# Patient Record
Sex: FEMALE | Race: WHITE | Employment: STUDENT | URBAN - METROPOLITAN AREA
[De-identification: names, ages, dates, MRNs, and addresses within clinical notes are randomized per-mention and may not be internally consistent; named-entity substitution may affect disease eponyms.]

---
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
## 2019-02-01 ENCOUNTER — OFFICE VISIT (OUTPATIENT)
Dept: INTERNAL MEDICINE CLINIC | Facility: CLINIC | Age: 19
End: 2019-02-01
Payer: COMMERCIAL

## 2019-02-01 VITALS
HEIGHT: 64.5 IN | SYSTOLIC BLOOD PRESSURE: 118 MMHG | RESPIRATION RATE: 16 BRPM | WEIGHT: 179 LBS | BODY MASS INDEX: 30.19 KG/M2 | DIASTOLIC BLOOD PRESSURE: 76 MMHG | HEART RATE: 74 BPM

## 2019-02-01 DIAGNOSIS — J45.20 MILD INTERMITTENT ASTHMA WITHOUT COMPLICATION: ICD-10-CM

## 2019-02-01 PROCEDURE — 99214 OFFICE O/P EST MOD 30 MIN: CPT | Performed by: NURSE PRACTITIONER

## 2019-02-01 NOTE — TELEPHONE ENCOUNTER
Fax from [facility] S Mayelin Ulloa - prior auth needed for Phentermine HCl 15 MG. Form in triage. Another fax from [place] stating phentermine 15mg ODT capsules are cheaper than tablets - ok to dispense? Form in triage.

## 2019-02-01 NOTE — PROGRESS NOTES
HPI:    Patient ID: Crystal Martinez is a 22year old female. Patient presents with:  Weight Problem      Patient has been away at school for 2 years. She gained 20# her freshman year d/t change in diet because she lived in [place].  She ate a lot of fried REPAIR Left 2/19/2015    Performed by Karan Pickett MD at [facility] MAIN OR     Family History   Problem Relation Age of Onset   • Hypertension Mother    • Heart Disorder Maternal Grandfather    • Heart Disorder Paternal Grandfather      Social History    Soci oriented to person, place, and time. Skin: Skin is warm and dry. Psychiatric: She has a normal mood and affect.             ASSESSMENT/PLAN:   Diagnoses and all orders for this visit:    BMI 30.0-30.9,adult- start phentermine every morning, discussed si

## 2019-02-04 ENCOUNTER — TELEPHONE (OUTPATIENT)
Dept: INTERNAL MEDICINE CLINIC | Facility: CLINIC | Age: 19
End: 2019-02-04

## 2019-02-04 RX ORDER — PHENTERMINE HYDROCHLORIDE 15 MG/1
15 CAPSULE ORAL EVERY MORNING
Qty: 30 CAPSULE | Refills: 0 | OUTPATIENT
Start: 2019-02-04 | End: 2019-02-28 | Stop reason: ALTCHOICE

## 2019-02-04 NOTE — TELEPHONE ENCOUNTER
Per pharmacy Phentermine capsules are cheaper than dispersible tablets. Per Nathan Matta ok to change. New Rx called in and pharmacist will inform patient when ready.

## 2019-02-04 NOTE — TELEPHONE ENCOUNTER
Patient's mom called and requested to speak to a RN referencing to having a hard time refilling a prescription.

## 2019-02-28 ENCOUNTER — TELEPHONE (OUTPATIENT)
Dept: INTERNAL MEDICINE CLINIC | Facility: CLINIC | Age: 19
End: 2019-02-28

## 2019-02-28 RX ORDER — PHENTERMINE HYDROCHLORIDE 30 MG/1
30 CAPSULE ORAL EVERY MORNING
Qty: 30 CAPSULE | Refills: 0 | Status: SHIPPED | OUTPATIENT
Start: 2019-02-28 | End: 2019-11-27

## 2019-02-28 NOTE — TELEPHONE ENCOUNTER
Patient away at school, was seeing mother today in office. Patient has not noticed any significant decrease in weight, denies palpitations.  Would like to increase dose

## 2019-03-06 ENCOUNTER — TELEPHONE (OUTPATIENT)
Dept: INTERNAL MEDICINE CLINIC | Facility: CLINIC | Age: 19
End: 2019-03-06

## 2019-03-06 NOTE — TELEPHONE ENCOUNTER
Patient's dad dropped off physical form to be completed and signed - call him when complete, he can  on Friday, #[phone]. Form in triage. right facial droop/ +involving right forehead

## 2019-03-21 ENCOUNTER — OFFICE VISIT (OUTPATIENT)
Dept: INTERNAL MEDICINE CLINIC | Facility: CLINIC | Age: 19
End: 2019-03-21
Payer: COMMERCIAL

## 2019-03-21 VITALS
BODY MASS INDEX: 29.68 KG/M2 | HEART RATE: 90 BPM | HEIGHT: 64.4 IN | TEMPERATURE: 98 F | OXYGEN SATURATION: 99 % | WEIGHT: 176 LBS | RESPIRATION RATE: 16 BRPM | DIASTOLIC BLOOD PRESSURE: 68 MMHG | SYSTOLIC BLOOD PRESSURE: 114 MMHG

## 2019-03-21 DIAGNOSIS — Z00.00 ANNUAL PHYSICAL EXAM: Primary | ICD-10-CM

## 2019-03-21 DIAGNOSIS — E66.09 OBESITY DUE TO EXCESS CALORIES WITHOUT SERIOUS COMORBIDITY, UNSPECIFIED CLASSIFICATION: ICD-10-CM

## 2019-03-21 PROCEDURE — 99395 PREV VISIT EST AGE 18-39: CPT | Performed by: NURSE PRACTITIONER

## 2019-03-21 RX ORDER — PHENTERMINE HYDROCHLORIDE 30 MG/1
30 CAPSULE ORAL EVERY MORNING
Qty: 30 CAPSULE | Refills: 0 | Status: SHIPPED | OUTPATIENT
Start: 2019-04-30 | End: 2019-06-27

## 2019-03-21 RX ORDER — PHENTERMINE HYDROCHLORIDE 30 MG/1
30 CAPSULE ORAL EVERY MORNING
Qty: 30 CAPSULE | Refills: 0 | Status: SHIPPED | OUTPATIENT
Start: 2019-03-31 | End: 2019-06-27

## 2019-03-21 NOTE — PROGRESS NOTES
Wellness Exam    CC: Patient is presenting for a wellness exam    HPI:   Concerns: none  Also f/u for weight loss--  Pt on phentermine for weight loss. Did not feel like 15mg was helping and states the 30mg PO daily dose is working better.  She is exercisin 84 tablet Rfl: 3   Albuterol Sulfate HFA (PROAIR HFA) 108 (90 Base) MCG/ACT Inhalation Aero Soln Inhale 2 puffs into the lungs every 6 (six) hours as needed for Wheezing or Shortness of Breath.  Disp: 3 Inhaler Rfl: 3   ACZONE 7.5 % External Gel APPLY A THI rub heard. Pulmonary/Chest: Effort normal and breath sounds normal bilaterally. She has no wheezes or rales. Breasts: deferred  Abdominal: Soft. Bowel sounds are normal. There is no tenderness. No HSM. Musculoskeletal: Normal range of motion.  She exhib

## 2019-03-21 NOTE — PROGRESS NOTES
Patient was seen and examined by me as well as APN student. Agree with assessment and plan.    NINI Rios

## 2019-06-27 ENCOUNTER — OFFICE VISIT (OUTPATIENT)
Dept: INTERNAL MEDICINE CLINIC | Facility: CLINIC | Age: 19
End: 2019-06-27
Payer: COMMERCIAL

## 2019-06-27 VITALS
TEMPERATURE: 98 F | OXYGEN SATURATION: 97 % | SYSTOLIC BLOOD PRESSURE: 110 MMHG | HEART RATE: 78 BPM | HEIGHT: 64.4 IN | RESPIRATION RATE: 16 BRPM | WEIGHT: 174 LBS | BODY MASS INDEX: 29.35 KG/M2 | DIASTOLIC BLOOD PRESSURE: 60 MMHG

## 2019-06-27 DIAGNOSIS — E66.3 OVERWEIGHT (BMI 25.0-29.9): Primary | ICD-10-CM

## 2019-06-27 DIAGNOSIS — Z97.5 CONTRACEPTIVE DEVICE, INTRAUTERINE: ICD-10-CM

## 2019-06-27 PROCEDURE — 99213 OFFICE O/P EST LOW 20 MIN: CPT | Performed by: NURSE PRACTITIONER

## 2019-06-27 RX ORDER — PHENTERMINE HYDROCHLORIDE 30 MG/1
30 CAPSULE ORAL EVERY MORNING
Qty: 30 CAPSULE | Refills: 2 | Status: SHIPPED | OUTPATIENT
Start: 2019-06-27 | End: 2019-07-16

## 2019-06-27 NOTE — PROGRESS NOTES
HPI:    Patient ID: Danae Ariza is a 20year old female. Patient presents with:  Medication Request: Phentermine      Feeling well on phentermine. She was in [place] in April and was eating excessive amt of food and is now back on track.  Down 2# from Medications:  Phentermine HCl 30 MG Oral Cap Take 1 capsule (30 mg total) by mouth every morning. Disp: 30 capsule Rfl: 2   Phentermine HCl 30 MG Oral Cap Take 1 capsule (30 mg total) by mouth every morning.  Disp: 30 capsule Rfl: 0   Norethin Ace-Eth [place] [place]

## 2019-07-16 ENCOUNTER — OFFICE VISIT (OUTPATIENT)
Dept: OBGYN CLINIC | Facility: CLINIC | Age: 19
End: 2019-07-16
Payer: COMMERCIAL

## 2019-07-16 VITALS
DIASTOLIC BLOOD PRESSURE: 84 MMHG | WEIGHT: 172 LBS | HEIGHT: 63.5 IN | BODY MASS INDEX: 30.1 KG/M2 | SYSTOLIC BLOOD PRESSURE: 128 MMHG

## 2019-07-16 DIAGNOSIS — Z11.3 SCREEN FOR STD (SEXUALLY TRANSMITTED DISEASE): ICD-10-CM

## 2019-07-16 DIAGNOSIS — Z30.09 COUNSELING FOR BIRTH CONTROL REGARDING INTRAUTERINE DEVICE (IUD): Primary | ICD-10-CM

## 2019-07-16 PROCEDURE — 87591 N.GONORRHOEAE DNA AMP PROB: CPT | Performed by: NURSE PRACTITIONER

## 2019-07-16 PROCEDURE — 99213 OFFICE O/P EST LOW 20 MIN: CPT | Performed by: NURSE PRACTITIONER

## 2019-07-16 PROCEDURE — 87491 CHLMYD TRACH DNA AMP PROBE: CPT | Performed by: NURSE PRACTITIONER

## 2019-07-16 RX ORDER — MISOPROSTOL 200 UG/1
TABLET ORAL
Qty: 2 TABLET | Refills: 0 | Status: SHIPPED | OUTPATIENT
Start: 2019-07-16 | End: 2019-07-24

## 2019-07-16 NOTE — PROGRESS NOTES
Here for new gynecology visit. 20year old G 0 P 0. Patient's last menstrual period was 06/25/2019 (exact date). Here to discuss IUD insertion. She in unsure if she wants to do non- hormonal or the hormonal IUD. Menses Q 28 days for 4-5 days.   OCP pneumonia in past.  Heart:  No chest pain, palpitations. Breasts:  No pain, lumps or secretions. GI:   No nausea, emesis, reflux, liver disease, GB problems, issues with diarrhea or constipation.   :   No urgency, frequency, KASEY, bladder problems in pas

## 2019-07-17 LAB
C TRACH DNA SPEC QL NAA+PROBE: NEGATIVE
N GONORRHOEA DNA SPEC QL NAA+PROBE: NEGATIVE

## 2019-07-24 ENCOUNTER — OFFICE VISIT (OUTPATIENT)
Dept: OBGYN CLINIC | Facility: CLINIC | Age: 19
End: 2019-07-24
Payer: COMMERCIAL

## 2019-07-24 VITALS
DIASTOLIC BLOOD PRESSURE: 78 MMHG | HEIGHT: 63.5 IN | WEIGHT: 169 LBS | BODY MASS INDEX: 29.57 KG/M2 | SYSTOLIC BLOOD PRESSURE: 130 MMHG

## 2019-07-24 DIAGNOSIS — Z30.430 ENCOUNTER FOR INSERTION OF INTRAUTERINE CONTRACEPTIVE DEVICE: Primary | ICD-10-CM

## 2019-07-24 DIAGNOSIS — Z01.812 PRE-PROCEDURAL LABORATORY EXAMINATION: ICD-10-CM

## 2019-07-24 LAB — CONTROL LINE PRESENT WITH A CLEAR BACKGROUND (YES/NO): YES YES/NO

## 2019-07-24 PROCEDURE — 58300 INSERT INTRAUTERINE DEVICE: CPT | Performed by: NURSE PRACTITIONER

## 2019-07-24 PROCEDURE — 81025 URINE PREGNANCY TEST: CPT | Performed by: NURSE PRACTITIONER

## 2019-07-24 NOTE — PROGRESS NOTES
Procedure:    S:  The patient was consented for Hall Coke placement. Risks and benefits were discussed including infection, uterine perforation, uterine expulsion, PID, ectopic and intrauterine pregnancy. All questions were answered.     O:  The patient was

## 2019-08-15 ENCOUNTER — OFFICE VISIT (OUTPATIENT)
Dept: OBGYN CLINIC | Facility: CLINIC | Age: 19
End: 2019-08-15
Payer: COMMERCIAL

## 2019-08-15 VITALS
HEIGHT: 63.5 IN | WEIGHT: 171 LBS | BODY MASS INDEX: 29.92 KG/M2 | SYSTOLIC BLOOD PRESSURE: 122 MMHG | DIASTOLIC BLOOD PRESSURE: 70 MMHG

## 2019-08-15 DIAGNOSIS — Z30.431 IUD CHECK UP: Primary | ICD-10-CM

## 2019-08-15 PROCEDURE — 99213 OFFICE O/P EST LOW 20 MIN: CPT | Performed by: NURSE PRACTITIONER

## 2019-08-15 NOTE — PROGRESS NOTES
Gyne note     S: patient is a 20year old yo  here for IUD check   Bleeding: She had a few days of spotting following the insertion but none since. Pain: minimal cramping after placement, none now.     O:/70   Ht 63.5\"   Wt 171 lb   LMP

## 2019-11-27 ENCOUNTER — TELEPHONE (OUTPATIENT)
Dept: INTERNAL MEDICINE CLINIC | Facility: CLINIC | Age: 19
End: 2019-11-27

## 2019-11-27 ENCOUNTER — OFFICE VISIT (OUTPATIENT)
Dept: INTERNAL MEDICINE CLINIC | Facility: CLINIC | Age: 19
End: 2019-11-27
Payer: COMMERCIAL

## 2019-11-27 VITALS
HEART RATE: 76 BPM | RESPIRATION RATE: 16 BRPM | WEIGHT: 169 LBS | TEMPERATURE: 98 F | OXYGEN SATURATION: 100 % | BODY MASS INDEX: 29.57 KG/M2 | SYSTOLIC BLOOD PRESSURE: 132 MMHG | HEIGHT: 63.5 IN | DIASTOLIC BLOOD PRESSURE: 80 MMHG

## 2019-11-27 DIAGNOSIS — J22 ACUTE LOWER RESPIRATORY INFECTION: Primary | ICD-10-CM

## 2019-11-27 DIAGNOSIS — E66.3 OVERWEIGHT (BMI 25.0-29.9): ICD-10-CM

## 2019-11-27 PROCEDURE — 99214 OFFICE O/P EST MOD 30 MIN: CPT | Performed by: PHYSICIAN ASSISTANT

## 2019-11-27 PROCEDURE — 90471 IMMUNIZATION ADMIN: CPT | Performed by: PHYSICIAN ASSISTANT

## 2019-11-27 PROCEDURE — 90686 IIV4 VACC NO PRSV 0.5 ML IM: CPT | Performed by: PHYSICIAN ASSISTANT

## 2019-11-27 RX ORDER — BENZONATATE 200 MG/1
200 CAPSULE ORAL 3 TIMES DAILY PRN
Qty: 20 CAPSULE | Refills: 0 | Status: SHIPPED | OUTPATIENT
Start: 2019-11-27

## 2019-11-27 RX ORDER — AZITHROMYCIN 250 MG/1
TABLET, FILM COATED ORAL
Qty: 6 TABLET | Refills: 0 | Status: SHIPPED | OUTPATIENT
Start: 2019-11-27 | End: 2021-03-30

## 2019-11-27 RX ORDER — PHENTERMINE HYDROCHLORIDE 30 MG/1
30 CAPSULE ORAL EVERY MORNING
Qty: 30 CAPSULE | Refills: 2 | Status: SHIPPED | OUTPATIENT
Start: 2019-11-27 | End: 2020-08-10

## 2019-11-27 NOTE — PATIENT INSTRUCTIONS
Take antibiotic as directed until completely finished. Contact us if you experience any adverse reaction to the medication.   Use tessalon up to 3 times daily, as needed, for cough   Restart phentermine when feeling better: take once a day before breakfast,

## 2019-11-27 NOTE — TELEPHONE ENCOUNTER
PA completed via Epic awaiting decision. Approved   11/27/2019 12:08 PM   Case ID: [id number] Appeal supported: No   Note from payer: TONO:[id number];ABDOUL:[email or web address]; Review Type:Prior Auth; Coverage Start Date:10/28/2019; Coverage End Date:11/26/2020;   Pay

## 2019-11-27 NOTE — PROGRESS NOTES
Dustin Smith is a 20year old female. HPI:   C/o cough, chest congestion x 4 days, gradually worsening. Delsym otc not helping. Denies sinus pain, rash, sore throat    Also f/u on phentermine.  Did very well on previous rx, lost about 15 lbs, no side effe (76.7 kg)   LMP 11/06/2019 (Approximate)   SpO2 100%   BMI 29.47 kg/m²   GENERAL: well developed, well nourished,in no apparent distress  SKIN: no rashes,no suspicious lesions, warm and dry  HEENT: atraumatic, normocephalic,ears and throat are clear.  TM's

## 2020-03-10 ENCOUNTER — PATIENT OUTREACH (OUTPATIENT)
Dept: INTERNAL MEDICINE CLINIC | Facility: CLINIC | Age: 20
End: 2020-03-10

## 2020-03-12 ENCOUNTER — MED REC SCAN ONLY (OUTPATIENT)
Dept: INTERNAL MEDICINE CLINIC | Facility: CLINIC | Age: 20
End: 2020-03-12

## 2020-08-10 DIAGNOSIS — E66.3 OVERWEIGHT (BMI 25.0-29.9): ICD-10-CM

## 2020-08-10 RX ORDER — PHENTERMINE HYDROCHLORIDE 30 MG/1
30 CAPSULE ORAL EVERY MORNING
Qty: 30 CAPSULE | Refills: 2 | Status: SHIPPED | OUTPATIENT
Start: 2020-08-10 | End: 2021-03-30

## 2021-03-30 ENCOUNTER — OFFICE VISIT (OUTPATIENT)
Dept: INTERNAL MEDICINE CLINIC | Facility: CLINIC | Age: 21
End: 2021-03-30
Payer: COMMERCIAL

## 2021-03-30 VITALS
HEIGHT: 63.5 IN | RESPIRATION RATE: 16 BRPM | TEMPERATURE: 99 F | BODY MASS INDEX: 30.8 KG/M2 | HEART RATE: 75 BPM | WEIGHT: 176 LBS | SYSTOLIC BLOOD PRESSURE: 128 MMHG | OXYGEN SATURATION: 95 % | DIASTOLIC BLOOD PRESSURE: 80 MMHG

## 2021-03-30 DIAGNOSIS — Z00.00 LABORATORY EXAMINATION ORDERED AS PART OF A ROUTINE GENERAL MEDICAL EXAMINATION: ICD-10-CM

## 2021-03-30 DIAGNOSIS — J45.20 MILD INTERMITTENT ASTHMA WITHOUT COMPLICATION: ICD-10-CM

## 2021-03-30 DIAGNOSIS — Z00.00 ANNUAL PHYSICAL EXAM: Primary | ICD-10-CM

## 2021-03-30 PROCEDURE — 90715 TDAP VACCINE 7 YRS/> IM: CPT | Performed by: NURSE PRACTITIONER

## 2021-03-30 PROCEDURE — 3079F DIAST BP 80-89 MM HG: CPT | Performed by: NURSE PRACTITIONER

## 2021-03-30 PROCEDURE — 99395 PREV VISIT EST AGE 18-39: CPT | Performed by: NURSE PRACTITIONER

## 2021-03-30 PROCEDURE — 90471 IMMUNIZATION ADMIN: CPT | Performed by: NURSE PRACTITIONER

## 2021-03-30 PROCEDURE — 3074F SYST BP LT 130 MM HG: CPT | Performed by: NURSE PRACTITIONER

## 2021-03-30 PROCEDURE — 86580 TB INTRADERMAL TEST: CPT | Performed by: NURSE PRACTITIONER

## 2021-03-30 PROCEDURE — 3008F BODY MASS INDEX DOCD: CPT | Performed by: NURSE PRACTITIONER

## 2021-03-30 NOTE — PROGRESS NOTES
Wellness Exam    CC: Patient is presenting for a wellness exam    HPI:   Concerns: Feeling well. Starting new job at [employer].      Pertinent Family History:   Family History   Problem Relation Age of Onset   • Hypertension Mother    • Heart Disorder Mate Systems   Constitutional: Negative for fever, chills and fatigue. HENT: Negative for hearing loss, congestion, sore throat and neck pain. Eyes: Negative for pain and visual disturbance. Respiratory: Negative for cough and shortness of breath.     Car oriented to person, place, and time. DTRs are +2 and symmetric. Cranial nerves grossly intact. Skin: Skin is warm. No rash noted. No erythema, pallor or jaundice.    Psychiatric: She has a normal mood and affect and her behavior is normal.     Assessment a

## 2021-04-02 ENCOUNTER — NURSE ONLY (OUTPATIENT)
Dept: INTERNAL MEDICINE CLINIC | Facility: CLINIC | Age: 21
End: 2021-04-02
Payer: COMMERCIAL

## 2021-04-02 ENCOUNTER — LAB ENCOUNTER (OUTPATIENT)
Dept: LAB | Age: 21
End: 2021-04-02
Attending: NURSE PRACTITIONER
Payer: COMMERCIAL

## 2021-04-02 DIAGNOSIS — Z00.00 LABORATORY EXAMINATION ORDERED AS PART OF A ROUTINE GENERAL MEDICAL EXAMINATION: ICD-10-CM

## 2021-04-02 PROCEDURE — 80061 LIPID PANEL: CPT | Performed by: NURSE PRACTITIONER

## 2021-04-02 PROCEDURE — 83036 HEMOGLOBIN GLYCOSYLATED A1C: CPT | Performed by: NURSE PRACTITIONER

## 2021-04-02 PROCEDURE — 82306 VITAMIN D 25 HYDROXY: CPT | Performed by: NURSE PRACTITIONER

## 2021-04-02 PROCEDURE — 36415 COLL VENOUS BLD VENIPUNCTURE: CPT | Performed by: NURSE PRACTITIONER

## 2021-04-02 PROCEDURE — 80050 GENERAL HEALTH PANEL: CPT | Performed by: NURSE PRACTITIONER

## 2021-04-02 PROCEDURE — 81003 URINALYSIS AUTO W/O SCOPE: CPT | Performed by: NURSE PRACTITIONER

## 2021-10-26 ENCOUNTER — PATIENT MESSAGE (OUTPATIENT)
Dept: INTERNAL MEDICINE CLINIC | Facility: CLINIC | Age: 21
End: 2021-10-26

## 2021-10-26 DIAGNOSIS — Z00.00 LABORATORY EXAMINATION ORDERED AS PART OF A ROUTINE GENERAL MEDICAL EXAMINATION: ICD-10-CM

## 2021-10-26 DIAGNOSIS — L70.0 ACNE VULGARIS: Primary | ICD-10-CM

## 2021-10-27 RX ORDER — MINOCYCLINE HYDROCHLORIDE 90 MG/1
90 CAPSULE, EXTENDED RELEASE ORAL DAILY
Qty: 30 CAPSULE | Refills: 0 | Status: SHIPPED | OUTPATIENT
Start: 2021-10-27 | End: 2021-10-28

## 2021-10-27 NOTE — TELEPHONE ENCOUNTER
Per Ange Noriega ok to refill 30 tab of Ximino 90mg. Patient scheduled physical with Ange Noriega the week of Thanksgiving. Lab orders placed. Prescription sent to pharmacy.

## 2021-10-27 NOTE — TELEPHONE ENCOUNTER
From: Sylvia Pope  To: Grupo Thomson, APRN  Sent: 10/26/2021 5:05 PM CDT  Subject: Prescription Question     Hi! I am hoping you can help as I am desperate. I need to refill a prescription for an acne-like rash around my mouth.  I've tried getting an a

## 2021-10-28 RX ORDER — MINOCYCLINE HYDROCHLORIDE 90 MG/1
90 TABLET, FILM COATED, EXTENDED RELEASE ORAL DAILY
Qty: 30 TABLET | Refills: 0 | Status: SHIPPED | OUTPATIENT
Start: 2021-10-28 | End: 2021-11-27

## 2021-10-29 ENCOUNTER — TELEPHONE (OUTPATIENT)
Dept: INTERNAL MEDICINE CLINIC | Facility: CLINIC | Age: 21
End: 2021-10-29

## 2021-10-29 NOTE — TELEPHONE ENCOUNTER
Received prior auth for     Minocycline HCl ER 90 MG Oral Tablet [address], [phone], [phone]         Paper placed in triage

## 2021-11-01 NOTE — TELEPHONE ENCOUNTER
PA approved 10/2/21-11/1/22  Case ID:  [id number]  Pt has tried ER tablets, Aczone Gel and Minocycline 100mg. Pharmacy notified.

## 2021-11-24 ENCOUNTER — OFFICE VISIT (OUTPATIENT)
Dept: INTERNAL MEDICINE CLINIC | Facility: CLINIC | Age: 21
End: 2021-11-24
Payer: COMMERCIAL

## 2021-11-24 ENCOUNTER — LAB ENCOUNTER (OUTPATIENT)
Dept: LAB | Age: 21
End: 2021-11-24
Attending: NURSE PRACTITIONER
Payer: COMMERCIAL

## 2021-11-24 ENCOUNTER — TELEPHONE (OUTPATIENT)
Dept: INTERNAL MEDICINE CLINIC | Facility: CLINIC | Age: 21
End: 2021-11-24

## 2021-11-24 VITALS
HEART RATE: 67 BPM | BODY MASS INDEX: 32.58 KG/M2 | DIASTOLIC BLOOD PRESSURE: 64 MMHG | HEIGHT: 63.5 IN | TEMPERATURE: 98 F | RESPIRATION RATE: 12 BRPM | OXYGEN SATURATION: 98 % | SYSTOLIC BLOOD PRESSURE: 122 MMHG | WEIGHT: 186.19 LBS

## 2021-11-24 DIAGNOSIS — Z00.00 LABORATORY EXAMINATION ORDERED AS PART OF A ROUTINE GENERAL MEDICAL EXAMINATION: ICD-10-CM

## 2021-11-24 DIAGNOSIS — L70.0 ACNE VULGARIS: Primary | ICD-10-CM

## 2021-11-24 PROCEDURE — 3074F SYST BP LT 130 MM HG: CPT | Performed by: NURSE PRACTITIONER

## 2021-11-24 PROCEDURE — 3008F BODY MASS INDEX DOCD: CPT | Performed by: NURSE PRACTITIONER

## 2021-11-24 PROCEDURE — 99214 OFFICE O/P EST MOD 30 MIN: CPT | Performed by: NURSE PRACTITIONER

## 2021-11-24 PROCEDURE — 3078F DIAST BP <80 MM HG: CPT | Performed by: NURSE PRACTITIONER

## 2021-11-24 PROCEDURE — 80053 COMPREHEN METABOLIC PANEL: CPT | Performed by: NURSE PRACTITIONER

## 2021-11-24 RX ORDER — PHENTERMINE HYDROCHLORIDE 15 MG/1
15 CAPSULE ORAL EVERY MORNING
Qty: 30 CAPSULE | Refills: 2 | Status: SHIPPED | OUTPATIENT
Start: 2021-11-24

## 2021-11-24 NOTE — PROGRESS NOTES
HPI:    Patient ID: Santa Gerard is a 24year old female. Patient presents with:  Acne      Doing well on minocycline, no side effects. She had noted >75% improvement of facial acne.  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 30 capsule 2   • Minocycline HCl ER 90 MG Oral Tablet 24 Hr Take 1 tablet (90 mg total) by mouth daily.  30 tablet 0   • benzonatate 200 MG Oral Cap Take 1 capsul for: PHOS, PHOSPHORUS  No results found for: MG     PHYSICAL EXAM:   /64   Pulse 67   Temp 97.9 °F (36.6 °C)   Resp 12   Ht 5' 3.5\" (1.613 m)   Wt 186 lb 3.2 oz (84.5 kg)   LMP 11/10/2021 (Approximate)   SpO2 98%   BMI 32.47 kg/m²   Physical Exam  V

## 2021-11-24 NOTE — TELEPHONE ENCOUNTER
Prior auth approved for 30 capsules per 30 days.  Coverage 10/25/21- 02/22/22  Case ID [id number]  Notified pharmacy

## 2022-04-19 ENCOUNTER — TELEPHONE (OUTPATIENT)
Dept: INTERNAL MEDICINE CLINIC | Facility: CLINIC | Age: 22
End: 2022-04-19

## 2022-04-19 NOTE — TELEPHONE ENCOUNTER
Req: Prior Sandra Delvalle   Ph: [phone]  Fx: [phone]    Phentermine HCl 15 MG Oral Cap    Fax in triage

## 2022-04-20 NOTE — TELEPHONE ENCOUNTER
Attempted to initiate PA via Epic. Message received PA is a duplicate. Called pharmacy, they are faxing over the PA info.

## 2022-08-29 RX ORDER — PHENTERMINE HYDROCHLORIDE 15 MG/1
15 CAPSULE ORAL EVERY MORNING
Qty: 30 CAPSULE | Refills: 2 | Status: SHIPPED | OUTPATIENT
Start: 2022-08-29

## 2022-08-29 NOTE — TELEPHONE ENCOUNTER
Last time medication was refilled 11/24/21  Quantity and # of refills 30/2  Last OV 11/24/21  Next OV none scheduled

## 2023-02-28 ENCOUNTER — PATIENT MESSAGE (OUTPATIENT)
Dept: INTERNAL MEDICINE CLINIC | Facility: CLINIC | Age: 23
End: 2023-02-28

## 2023-02-28 NOTE — TELEPHONE ENCOUNTER
From: Nasir Ruvalcaba  To: JULIO Orlando  Sent: 2/28/2023 7:05 AM CST  Subject: Hand Peeling     Hi I was wondering if you have any recommendations for excessive peeling on my fingers?

## 2023-03-01 ENCOUNTER — OFFICE VISIT (OUTPATIENT)
Dept: INTERNAL MEDICINE CLINIC | Facility: CLINIC | Age: 23
End: 2023-03-01
Payer: COMMERCIAL

## 2023-03-01 VITALS
DIASTOLIC BLOOD PRESSURE: 72 MMHG | OXYGEN SATURATION: 98 % | RESPIRATION RATE: 16 BRPM | WEIGHT: 190 LBS | SYSTOLIC BLOOD PRESSURE: 118 MMHG | TEMPERATURE: 98 F | BODY MASS INDEX: 33.25 KG/M2 | HEIGHT: 63.5 IN | HEART RATE: 85 BPM

## 2023-03-01 DIAGNOSIS — L25.8 CONTACT DERMATITIS DUE TO SOAP: Primary | ICD-10-CM

## 2023-03-01 PROCEDURE — 3074F SYST BP LT 130 MM HG: CPT | Performed by: NURSE PRACTITIONER

## 2023-03-01 PROCEDURE — 3078F DIAST BP <80 MM HG: CPT | Performed by: NURSE PRACTITIONER

## 2023-03-01 PROCEDURE — 99213 OFFICE O/P EST LOW 20 MIN: CPT | Performed by: NURSE PRACTITIONER

## 2023-03-01 PROCEDURE — 3008F BODY MASS INDEX DOCD: CPT | Performed by: NURSE PRACTITIONER

## 2023-03-01 RX ORDER — SPIRONOLACTONE 50 MG/1
50 TABLET, FILM COATED ORAL 2 TIMES DAILY
COMMUNITY
Start: 2023-02-01

## 2023-06-29 ENCOUNTER — OFFICE VISIT (OUTPATIENT)
Dept: INTERNAL MEDICINE CLINIC | Facility: CLINIC | Age: 23
End: 2023-06-29
Payer: COMMERCIAL

## 2023-06-29 VITALS
WEIGHT: 192 LBS | HEIGHT: 63.5 IN | TEMPERATURE: 98 F | RESPIRATION RATE: 16 BRPM | HEART RATE: 80 BPM | BODY MASS INDEX: 33.6 KG/M2 | OXYGEN SATURATION: 98 % | SYSTOLIC BLOOD PRESSURE: 110 MMHG | DIASTOLIC BLOOD PRESSURE: 70 MMHG

## 2023-06-29 DIAGNOSIS — N62 MACROMASTIA: Primary | ICD-10-CM

## 2023-06-29 PROCEDURE — 3074F SYST BP LT 130 MM HG: CPT | Performed by: NURSE PRACTITIONER

## 2023-06-29 PROCEDURE — 3078F DIAST BP <80 MM HG: CPT | Performed by: NURSE PRACTITIONER

## 2023-06-29 PROCEDURE — 3008F BODY MASS INDEX DOCD: CPT | Performed by: NURSE PRACTITIONER

## 2023-06-29 PROCEDURE — 99213 OFFICE O/P EST LOW 20 MIN: CPT | Performed by: NURSE PRACTITIONER

## 2024-07-03 ENCOUNTER — OFFICE VISIT (OUTPATIENT)
Dept: INTERNAL MEDICINE CLINIC | Facility: CLINIC | Age: 24
End: 2024-07-03
Payer: COMMERCIAL

## 2024-07-03 VITALS
SYSTOLIC BLOOD PRESSURE: 126 MMHG | HEIGHT: 63.5 IN | TEMPERATURE: 97 F | OXYGEN SATURATION: 98 % | WEIGHT: 200 LBS | RESPIRATION RATE: 16 BRPM | BODY MASS INDEX: 35 KG/M2 | DIASTOLIC BLOOD PRESSURE: 80 MMHG | HEART RATE: 83 BPM

## 2024-07-03 DIAGNOSIS — E88.810 ABDOMINAL OBESITY AND METABOLIC SYNDROME: ICD-10-CM

## 2024-07-03 DIAGNOSIS — Z00.00 ANNUAL PHYSICAL EXAM: Primary | ICD-10-CM

## 2024-07-03 DIAGNOSIS — E66.9 ABDOMINAL OBESITY AND METABOLIC SYNDROME: ICD-10-CM

## 2024-07-03 DIAGNOSIS — J45.20 MILD INTERMITTENT ASTHMA WITHOUT COMPLICATION (HCC): ICD-10-CM

## 2024-07-03 PROCEDURE — 99395 PREV VISIT EST AGE 18-39: CPT | Performed by: NURSE PRACTITIONER

## 2024-07-03 PROCEDURE — 90677 PCV20 VACCINE IM: CPT | Performed by: NURSE PRACTITIONER

## 2024-07-03 PROCEDURE — 90471 IMMUNIZATION ADMIN: CPT | Performed by: NURSE PRACTITIONER

## 2024-07-03 RX ORDER — ALBUTEROL SULFATE 90 UG/1
2 AEROSOL, METERED RESPIRATORY (INHALATION) EVERY 6 HOURS PRN
Qty: 3 EACH | Refills: 3 | Status: SHIPPED | OUTPATIENT
Start: 2024-07-03

## 2024-07-03 NOTE — PROGRESS NOTES
Wellness Exam    CC: Patient is presenting for a wellness exam    HPI:   Concerns: concerns for her weight, dad had a heart scare. She has tried phentermine in the past and her weight yo-yos. Asthma well controlled. Considering breast reduction in the next year     Pertinent Family History:   Family History   Problem Relation Age of Onset    Hypertension Mother     Heart Disorder Maternal Grandfather     Heart Disorder Paternal Grandfather         Gyne:  defer to gyne   Health Maintenance   Topic Date Due    Pap Smear  07/05/2024            Denies pelvic pain, abnormal discharge or genital lesions.    Last mammogram: No recommendations at this time   Regular self breast exam: yes.    Bone Health: Last DEXA: na.  Osteoporosis prevention: weight resistant exercise    Last colonoscopy:  No recommendations at this time     Reported Health: Good.  Diet: heart healthy, exercise: routinely.    Past Medical History:    Asthma (HCC)    Instability of left shoulder joint    Instability of shoulder joint, right     Past Surgical History:   Procedure Laterality Date    Arthroscopy, shoulder, surgical; capsulorrhaphy Right 8/6/2015    Procedure: ARTHROSCOPY SHOULDER;  Surgeon: Pravin Escobedo MD;  Location: [facility]    Other surgical history Left 2/19/2015    Procedure: SHOULDER ARTHROSCOPY ROTATOR CUFF REPAIR;  Surgeon: Pravin Escobedo MD;  Location:  MAIN OR    Remove tonsils/adenoids,<12 y/o  2001    Shoulder arthroscopy  02/19/15    LEFT SHOULDER ARTHROSCOPY WITH ANTERIOR AND POSTERIOR CAPSULORRHAPHY     Social History     Socioeconomic History    Marital status: Single   Tobacco Use    Smoking status: Never    Smokeless tobacco: Never   Vaping Use    Vaping status: Never Used   Substance and Sexual Activity    Alcohol use: Yes     Comment: socially    Drug use: No    Sexual activity: Yes     Partners: Male     Birth control/protection: OCP   Other Topics Concern    Caffeine Concern No     Comment: occ    Exercise  Yes     Comment: 4x weekly     Seat Belt Yes     Current Outpatient Medications on File Prior to Visit   Medication Sig Dispense Refill    ACZONE 7.5 % External Gel APPLY A THIN LAYER TO FACE QAM  0     No current facility-administered medications on file prior to visit.       Review of Systems   Constitutional: Negative for fever, chills and fatigue.   HENT: Negative for hearing loss, congestion, sore throat and neck pain.    Eyes: Negative for pain and visual disturbance.   Respiratory: Negative for cough and shortness of breath.    Cardiovascular: Negative for chest pain and palpitations.   Gastrointestinal: Negative for nausea, vomiting, abdominal pain and diarrhea.   Genitourinary: Negative for urgency, frequency of urination, and abnormal vaginal bleeding.   Musculoskeletal: Negative for arthralgias and gait problem.   Skin: Negative for color change and rash.   Neurological: Negative for tremors, weakness and numbness.   Hematological: Negative for adenopathy. Does not bruise/bleed easily.   Psychiatric/Behavioral: Negative for confusion and agitation. The patient is not nervous/anxious.      /80   Pulse 83   Temp 97.4 °F (36.3 °C)   Resp 16   Ht 5' 3.5\" (1.613 m)   Wt 200 lb (90.7 kg)   LMP 07/01/2024 (Exact Date)   SpO2 98%   BMI 34.87 kg/m²   Physical Exam   Constitutional: She is oriented to person, place, and time. She appears well-developed. No distress.   Head: Normocephalic and atraumatic.   Eyes: EOM are normal. Pupils are equal, round, and reactive to light. No scleral icterus. Fundoscopic exam: No hemorrhages, A/V nicking, exudates or papilledema.  ENT: TM's clear, nose normal, no oropharyngeal exudates or tonsillar hypertrophy    Neck: Normal range of motion. No thyromegaly present.   Cardiovascular: Normal rate, regular rhythm and normal heart sounds.  No murmur or friction rub heard.  Pulmonary/Chest: Effort normal and breath sounds normal bilaterally. She has no wheezes or rales.    Breasts: deferred  Abdominal: Soft. Bowel sounds are normal. There is no tenderness. No HSM.  Musculoskeletal: Normal range of motion. She exhibits no edema.   Lymphadenopathy: She has no cervical, supraclavicular, or axillary adenopathy.   : deferred  Neurological: She is alert and oriented to person, place, and time. DTRs are +2 and symmetric. Cranial nerves grossly intact.  Skin: Skin is warm. No rash noted. No erythema, pallor or jaundice.   Psychiatric: She has a normal mood and affect and her behavior is normal.     Assessment and Plan:  Carline Camarillo is a 24 year old female here for a wellness exam.  Age appropriate cancer screening, labs, safety, immunizations were discussed with the patient and ordered as follows:    Asthma- ACT 25, refill albuterol prn- prevar 20 today    Orders Placed This Encounter   Procedures    CBC With Differential With Platelet     Standing Status:   Future     Standing Expiration Date:   7/3/2025    Comp Metabolic Panel (14)     Standing Status:   Future     Standing Expiration Date:   6/28/2025    Lipid Panel     Standing Status:   Future     Standing Expiration Date:   6/28/2025    TSH W Reflex To Free T4     Standing Status:   Future     Standing Expiration Date:   6/28/2025    Urinalysis, Routine     Standing Status:   Future     Standing Expiration Date:   7/3/2025       recommend regular cardiovascular and weight bearing exercise as well as a well-rounded diet.    Her 5 year prevention plan includes: annual physical and labs, 30 minutes exercise most days of week and heart healthy diet  Patient/Caregiver Education:  Patient/Caregiver Education: There are no barriers to learning. Medical education done.  Outcome: Patient verbalizes understanding.       Educated by: NINI Sorto

## 2024-11-21 DIAGNOSIS — J45.20 MILD INTERMITTENT ASTHMA WITHOUT COMPLICATION (HCC): ICD-10-CM

## 2024-11-21 RX ORDER — ALBUTEROL SULFATE 90 UG/1
2 INHALANT RESPIRATORY (INHALATION) EVERY 6 HOURS PRN
Qty: 3 EACH | Refills: 3 | Status: SHIPPED | OUTPATIENT
Start: 2024-11-21

## 2024-11-21 NOTE — TELEPHONE ENCOUNTER
Last time medication was refilled 7/3/24  Last office visit  7/3/24  Next office visit due/scheduled   No future appointments    Passed protocol, Medication sent.

## 2025-05-27 NOTE — PROGRESS NOTES
HISTORY OF PRESENT ILLNESS  Chief Complaint   Patient presents with    Weight Problem     Pcp referral, pt is interested in taking wt lose medication.       Carline Camarillo is a 25 year old female new to our office today for initiation of medical weight loss program, referred by JO ANN.  Patient presents today with c/o excess weight starting end of HS after shoulder surgery and reduction in organized fitness.    Reason/goal for weight loss: build a consistent routine and feel comfortable in my skin.    Previous weight loss efforts in the past: Phentermine, exercise    Past 6 months lifestyle interventions: yes, regular exercise    Reviewed [facility] patient contract. Readiness for Lifestyle change: 10/10, Interest in Medication: 10/10, Bariatric surgery interest: 0/10.    Barriers to weight loss: snacking    Wt Readings from Last 6 Encounters:   05/28/25 178 lb (80.7 kg)   07/03/24 200 lb (90.7 kg)   06/29/23 192 lb (87.1 kg)   03/01/23 190 lb (86.2 kg)   11/24/21 186 lb 3.2 oz (84.5 kg)   03/30/21 176 lb (79.8 kg)          Social hx and lifestyle reviewed:    How many meals do you eat out per week: 2  Who is the primary cook in your home: shared with partner    Breakfast Lunch Dinner Snacks Fluids   smoothie Chipotle quesdilla, rice, beans Chicken Caesar salad pretDr. Priya Askew     Work: teacher  Marital status: In relationship with partner  Support: yes  Tobacco use: no  ETOH use: 1-2 servings/week  Supplements: none  Exercise: 4x/week via walk, bike, weights  Stress level: 5/10, coping via listening to music, walking and talking with friends/family  Sleep hours and integrity: 7-8 hours/night, feeling rested, no snoring    MEDICAL HISTORY  PMH reviewed:   Cardiac disorders: negative  Depression/anxiety: negative  Glaucoma: negative  Kidney stones: negative  Eating disorder: negative  Migraines: negative  Seizures: negative  Joint-related conditions: negative  Liver disease: negative  Renal disease:  negative  Diabetes: negative  Thyroid disease: negative  Constipation: negative  Other pertinent hx: asthma- controlled  Sleep Apnea hx: negative  Cancer hx: negative  Cholecystectomy and/or gallstones: negative  Family or personal history of Pancreatic issues / Medullary Thyroid Cancer/MENS 2: negative  History of bariatric surgery: negative    FMH reviewed: obesity in parent/s or sibling: yes    REVIEW OF SYSTEMS  GENERAL: feels well otherwise  SKIN: denies any rashes to skin folds  HEENT: snoring- no  LUNGS: denies shortness of breath with exertion, no apnea  CARDIOVASCULAR: denies chest pain on exertion, denies palpitations or pedal edema  GI: denies abdominal pain.  No N/V/D/C  MUSCULOSKELETAL: denies joint pains  NEURO: denies headaches  PSYCH: denies change in behavior or mood, denies feeling sad or depressed. BED screen- negative.    EXAM    MBSAQIP Information Bariatric Seminar Date: 5/28/25 Patient type:   Lifestyle   Initial Body Fat %: 37  Today's Body Fat Female: 30.28 Change in Body Fat   %: 6.72   Date of Initial Weight: 05/28/2025 Initial Weight: 178 Today's Weight: 178   Weightloss to Date: 0   Weightloss Percentage: 0 5% Goal: 8.9 10% Goal: 17.8    Initial BMI: 31.2 Today's BMI: 31.2 Change in BMI: 0    Neck Circumference: 13  Waist Circumference Female: 35     Female Fat Mass: 53.9 Female Lean Mass: 124.1      Have any comorbidities improved since the last visit?   Hypertension DM 2 Dyslipidemia Osteoarthritis Sleep Apnea                 /80   Pulse 92   Resp 18   Ht 5' 3\" (1.6 m)   Wt 178 lb (80.7 kg)   LMP 05/02/2025 (Exact Date)   SpO2 98%   BMI 31.53 kg/m² ,   Percent body fat: F >32%: 37%.  VF: 7. Muscle Mass: 16.3%, WC: 35 inches.  GENERAL: well developed, well nourished, in no apparent distress, obese  SKIN: warm, pink, dry without rashes to exposed area  EYES: conjunctiva pink, sclera non icteric, PERRLA  HEENT: atraumatic, normocephalic, O/p: Mallampati score- 2  NECK:  supple, non tender, no adenopathy, no thyromegaly  LUNGS: CTA in all fields, breathing non labored  CARDIO: RRR without murmur, normal S1 and S2 without clicks or gallops, no pedal edema.  GI: +BS, soft, no masses, HSM or tenderness  MUSCULOSKELETAL: grossly intact  NEURO: Oriented times three  PSYCH: pleasant, cooperative, normal mood and affect    Lab Results   Component Value Date    WBC 7.7 04/02/2021    RBC 4.82 04/02/2021    HGB 14.7 04/02/2021    HCT 44.7 04/02/2021    MCV 92.7 04/02/2021    MCH 30.5 04/02/2021    MCHC 32.9 04/02/2021    RDW 12.4 04/02/2021    .0 04/02/2021     Lab Results   Component Value Date    GLU 73 11/24/2021    BUN 14 11/24/2021    BUNCREA 21.4 (H) 04/02/2021    CREATSERUM 0.62 11/24/2021    ANIONGAP 7 11/24/2021    GFRNAA 128 11/24/2021    GFRAA 148 11/24/2021    CA 10.1 11/24/2021    OSMOCALC 289 11/24/2021    ALKPHO 79 11/24/2021    AST 18 11/24/2021    ALT 23 11/24/2021    BILT 0.4 11/24/2021    TP 7.5 11/24/2021    ALB 4.2 11/24/2021    GLOBULIN 3.3 11/24/2021     11/24/2021    K 4.7 11/24/2021     11/24/2021    CO2 26.0 11/24/2021     Lab Results   Component Value Date     04/02/2021    A1C 5.4 04/02/2021     Lab Results   Component Value Date    CHOLEST 174 04/02/2021    TRIG 67 04/02/2021    HDL 50 04/02/2021     (H) 04/02/2021    VLDL 13 04/02/2021    NONHDLC 124 04/02/2021     Lab Results   Component Value Date    TSH 1.020 04/02/2021     No results found for: \"B12\", \"VITB12\"  Lab Results   Component Value Date    VITD 29.7 (L) 04/02/2021       Medications Ordered Prior to Encounter[1]    ASSESSMENT  Initial Weight Data and Goal Weight Loss:  Weight Calculations  Initial Weight: 178 lbs  Initial Weight Date: 05/28/25  Today's Weight: 178 lbs  5% Goal: 8.9  10% Goal: 17.8  Total Weight Loss: 0 lbs    Diagnoses and all orders for this visit:    Encounter for therapeutic drug monitoring  -     Vitamin D; Future  -     Vitamin B12; Future  -      Tirzepatide-Weight Management (ZEPBOUND) 2.5 MG/0.5ML Subcutaneous Solution Auto-injector; Inject 2.5 mg into the skin once a week.  -     Tirzepatide-Weight Management (ZEPBOUND) 5 MG/0.5ML Subcutaneous Solution Auto-injector; Inject 5 mg into the skin once a week. Start after completing full 4 weeks on 2.5 mg weekly dose.    Class 1 obesity with serious comorbidity and body mass index (BMI) of 31.0 to 31.9 in adult, unspecified obesity type  - Start Zepbound as directed  - Reviewed balanced plate nutrition with focus on whole food, regular meals daily that include protein and produce and eliminating/reducing late night eating.  - Counseled on the 4 Pillars of health (sleep, stress, nutrition and fitness).  - Reviewed weight synopsis in EMR  - Instructed to use contraception at all times while on AOMs.  Comments:  Baseline BMI: 34.87 (7/3/24)  Orders:  -     OP REFERRAL TO DIETITIAN EMG [facility] ([facility] USE ONLY)  -     Vitamin D; Future  -     Vitamin B12; Future  -     Tirzepatide-Weight Management (ZEPBOUND) 2.5 MG/0.5ML Subcutaneous Solution Auto-injector; Inject 2.5 mg into the skin once a week.  -     Tirzepatide-Weight Management (ZEPBOUND) 5 MG/0.5ML Subcutaneous Solution Auto-injector; Inject 5 mg into the skin once a week. Start after completing full 4 weeks on 2.5 mg weekly dose.    Mild intermittent asthma without complication (HCC)  -     OP REFERRAL TO DIETITIAN EMG [facility] ([facility] USE ONLY)        PLAN  Medication use and side effects reviewed with patient.  Medication contraindications: none foreseen  Follow up with dietitian and psychologist as recommended.  Discussed the role of sleep and stress in weight management.  Labs orders as above.  Counseled on comprehensive weight loss plan including attention to nutrition, exercise and behavior/stress management for success. See patient instruction below for more details.  Reviewed previous labs in EMR/Care Everywhere  Weight Loss Contract reviewed and  signed.    Patient Instructions   Welcome to the [facility] Program...your Lifestyle Renovation begins now!  Thank you for placing your trust in our health care team, I look forward to working with you along this journey to better health!    Next steps:     1.  Call our office at [phone] to schedule a personal nutrition consultation with one of our registered dieticians, Flaquito Jameson. Bring along your food journal (3 days minimum). See journal options below.  2.  Complete fasting (10-12 hours, water only) labs at [facility] lab site prior to next office visit. Lab results will be communicated via [employer].  3.  Body composition completed today with findings of: Total body fat: 37% (goal < 32%), Visceral Fat: 7 (goal <10), Muscle mass: 16.3% (goal >18%), and waist circumference 35 inches (goal <35 inches).  4.  Use contraception at all times while on anti-obesity medications.  5.  Fill your prescribed medication and take as discussed and prescribed: Start Zepbound at 2.5 mg weekly. After 4 weeks increase to the next dose of 5 mg weekly. If at a weight plateau for >3 weeks, then send [employer] message with current scale weight to determine if a dose adjustment is appropriate. Otherwise plan to maintain this dose until next visit. Any further dose titrations beyond this dose will be considered at appointments only, unless otherwise discussed. Visit the website [email or web address] and click on Consumers for additional details, savings, and further dosing instructions. This medication may require a prior authorization (PA) by your insurance. A PA may take one week plus to complete and our office will be in touch during this process if needed. If cost/supply prohibitive plan: generic alternative to Contrave (www.contrave.com) with Naltrexone and Bupropion XL.    Tips while taking an injectable medication:    Be an intuitive eater. Listen to your hunger and fullness signals, stopping when  you are full.  Consume protein and produce in your day, striving for a rainbow of color of produce.  Reduce portions to starting size of 1 cup and check in with your gut to see if you are full. Use a sand timer to slow down your eating pace to allow for 15-20 minutes to complete a meal and use the \"2 bite rule\".  Reduce refined sugars and high fat foods, as they may contribute to greater side effects of nausea and heartburn.  Stop eating 3 hours before bedtime to allow your food to digest.  Remain hydrated with water or non caloric and non caffeine beverages.  Use over the counter raegan lozenge/supplement to help reduce nausea if needed.  If you have been off your medication for more than 2 weeks please notify our office to determine next dosing, as a return to previous dose may not be appropriate or tolerated.  Zepbound can be kept at room temperature for up to 3 weeks.    Please try to work on the following dietary changes this first month:    1.  Drink water with meals and throughout the day, cut down on soda and/or juice if consumed. Consider flavored water options like Bubbly, Spindrift, Hint and Lakisha. Reduce alcohol servings to 4 per week maximum.  2.  Have protein with each meal, examples include: greek yogurt, cottage cheese, hard boiled egg, tofu, chicken, fish, or tuna.   3.  Work towards reducing/eliminating refined carbohydrates and sugars which includes items such as sweets, as well as rice, pasta, and bread and make sure to choose whole grain options when having them with just 1 serving per meal about the size of your inner palm.  4.  Consume non starchy veggies daily working towards making them a good 50% of your daily food intake. Add them to lunch and dinner consistently.  5.  Start a daily probiotic: VSL#3 is recommended, (order on line at www.vsl3.com). Take 1 capsule daily with water for 30 days, then reduce to 1 every other day (this will reduce the cost). Capsules can be left out of  refrigerator for 2 weeks. I recommend using a pill box weekly and keeping the bottle in the fridge.    Please download harper My Fitness Pal, LoseIt! Or My Net Diary to monitor daily dietary intake and you will be able to see if you are eating the right amount of calories or too much or too little which would hinder weight loss. Additionally this will help to see your daily carbohydrate and protein intake. When you set the harper up choose 1.5 lbs/week as a goal.  Keeping a paper food journal is an option as well to remain accountable for your choices- this is the start to mindful eating! A low calorie diet has been consistently shown to support weight loss.    Continue or start exercising to help establish a routine. If not already exercising begin with 1 day/week and progress as able with the goal of working towards 30 minutes 5 days a week at a minimum. A variety or cardio, strength and stretching is important. Review resources below to help support you in building this healthy routine.    Meditation daily can help manage and control stress. Chronic stress can make weight loss difficult.  Exercising is one way to help with stress, but meditation using the CALM Harper or another comparable alternative can be done in your home or place of work with little time commitment. This Harper can also help work on behavior change and improve sleep. Check out the segment under Calm Masterclass and listen to The 4 Pillars of Health. A great way to begin learning about the foundation of lifestyle with practical tips to use in your every day. In addition, we offer counseling services and support for individual connection and care. A referral is necessary so please let me know if this is a service you are interested.    Check out www.yourweightmatters.org blog for continued support and education along your weight loss journey to optimal health!      Patient Resources:    Personal Training/Fitness Classes/Health Coaching    [facility] in [place]: Full fitness center with group fitness and personal training located in [place].  Health Coaching with Ankita Burk, Tello Angeles, and Shaan Junior at our [facility]- individual coaching to work on your health goals. Call [phone] and/or email @ [email or web address]. Free 60 minute consult when client of [facility].  [facility] @ http://www.LocoX.com. A variety of group fitness options plus various yoga classes [phone] and/or email Darling at [email or web address] Health LenoirImpressto  [facility] Fitness Centers with multiple locations: Kivra (www.Jut Inc), Differential Dynamics5 Training (www.Luxtera), Pipeliner CRM Body Bootcamp (www.Neocutis), ShipHawk (www.Echo Global Logistics), The Exercise  (www.exercisecoach.com), Club PilProvidence Surgery Centers (www.Graine de Cadeaux)    Online Fitness  Fitness  on [employer]  Fit in 10 DVD series   www.wcohx87JKURun The Campaign  Chair exercises via Sit and Be Fit (www.sitandbefit.org) and Mom Made Foods (www.Internet Marketing Inc) or Maikel Simon or Errol Saunders videos on YouStormpulseube.  Hip Hop Fit with Harrison Mckinnon at www.hiphopfit.clinovo    Apps for on the Go Fitness  Spindale 7 Minute Workout (orange box with white 7) - free on the go HIIT training harper  Peloton Harper @ www.onepeloton.com    Nutrition Trackers, Meal Preparation, and Other Meal Programs  LoseIT! And My Fitness Pal apps and on line for tracking nutrition  NOOM - virtual health coaching  FitFoundation (healthy meals on the go) in [place] @ www.bjgzvrfejjwal6b.Re-Sec Technologies  Salvador ANAYA @ www.bistromd.Re-Sec Technologies and Erpavf22 (calorie smart and low carb plans recommended) @ www.hjrjaf02.com, Metabolic Meals @ www.OloMetabolicMeals.com - individual prepared meals to go  Gobble, Blue Apron, Home , Every Plate, Sunbasket- on line meal delivery programs for preparation at home  Meal Village in [place] for homemade meals to go @ www.QuNano.[employer]-Sec Technologies  Diet Doctor @  www.dietdoctor.com - low carb swaps  ReciMe and Mealime phillip (grocery and meal planning)    Stress, Anxiety, Depression, Trauma  CALM meditation phillip (www.calm.com)  Headspace  Don't let anxiety run your life. Using the science of emotion regulation and mindfulness to overcome fear and worry by Johann Willis PsyD and Brayden Reza MA.  The [employer] Podcast (September 27, 2023): 6 Magic Words That Stop Anxiety  What Happened to You?- a look at the impact trauma has on behavior written by Lauryn Cisneros and Dr. Osman Castro  Whole Again by Miguel Angel Horn - discovering your true self after trauma    Mindful Eating/The Hungry Brain  Am I Hungry? Mindful eating virtual  phillip (www.amihungry.com)  The Hungry Brain by Kita Reyes, PhD  Mindless Eating by Harmeet Lara  Weight Loss Surgery Will Not Treat Food Addiction by Franca Mayorga Ph.D    Metabolic Dysfunction, Hormones and Cravings  Why We Get Sick? By Rajendra Cochran (insulin resistance)  Your Body in Balance: The New Science of Food, Hormones, and Health by Dr. Shahbaz Rodriguez  The Complete Guide to fasting by Dr. Castillo  Fast Like a Girl by Dr. Ileana Brown  The M Factor (documentary on PBS about Menopause)  Sugar, Salt & Fat by Sydney Qureshi, Ph.D, R.D.  The Truth About Sugar - documentary on sugar (Free on Money Dashboard, https://youtu.be/2H4duhjSM2k)  Presentation on SUGAR called Sugar: The Bitter Truth by Dr. Pravin Malone (Money Dashboard) https://youtu.be/dBnniua6-oM?si=[email or web address]  Reverse Visceral Fat: #1 Way to Increase Your Lifespan & End Inflammation with Dr. Gil Bustillo on Utube @ https://youZuliu.be/nupPRnvUpJY?si=de0jjhQwFVH7KsjA    Nutrition Support  You Are What You Eat - Netfix series on twin study looking at impact of nutrition changes on health  The End of Dieting: How to Live for Life by Dr. Alexsander Mccormick M.D. or listen to The hive01 Podcast Episode 63: Understanding \"Nutritarian\" Eating w/Dr. Alexsander Mccormick  The Game Changers- [employer]cashix  Documentary on plant based nutrition  The Dr. Garcia T5 Wellness Plan by Dr. Deon Garcia MD  The Complete Guide to fasting by Dr. Castillo  @[facility] ([facility] Dietician with support surrounding nutrition and meal prep/planning)    Education, Motivation and Support Resources  Live to 100: Secrets of the Blue Zones - Netflix series on the secrets to communities living over 100 years old  Atomic Habits by Ho Swartz (a book about taking small steps to promote greater behavior change)   Motivation phillip (black box with white \")- daily supportive messages sent to your phone  Can't Hurt Me by Johann Nielsen (a book exploring the power of discipline in achieving your goals)  Fed Up - documentary about obesity (Free on Utube)  Www.yourweightmatters.org - Obesity Action Coalition sponsored Blog posts  Obesity Action Coalition Resources on topics specific to weight management (www.obesityaction.org)  Fitlosophy Fitspiration - journal to better health (journal book found at [employer] in fitness aisle)  Chilo Bustamante talk titled: The Call to Courage (Netflix)  The Exam Room by the Physician's Committee (Podcast)  Nutrition Facts by Dr. Pulliam (Podcast)    Balanced Nutrition includes:     Build the mentality of Food 4 Fuel. Clean eating with whole foods and eliminating/reducing ultra processed foods.  Be an intuitive eater and using mindful eating practices.  Eat a balanced plate with protein and produce at all meals: 1/4 plate- protein, 1/2 plate non starchy veggies, and 1/4 plate fruit or complex carbohydrate.  Drink water with all meals and use a salad plate to naturally reduce portions.  Eliminate/reduce late night eating by stopping after 7pm. Allowing your body to fast for 12 hours (drink only water, tea or black coffee without any additives).                Mindful Eating Tips:  When we sit down to a meal by ourselves or with friends, it's easy to zone out and disconnect from our bodies. But, if you approach eating a meal with  the intent to stay mindful and present, you will be able to enjoy yourself and walk away from the table feeling good about yourself and your choices. Here are several tips to keep in mind when it comes to food and eating.    Choose for yourself: Do not get hung up on what other people are eating. Instead, ask yourself what you would like to eat.    Forget about good and bad: Remind yourself that foods fall on a nutritional continuum (high value/low value), not on a moral continuum (good/bad).    Stay clear of guilt or shame: Refrain from allowing guilt or shame to contaminate your eating decisions. Avoid secret eating and get clear on who you are really hiding from if you eat in secret.    Choose foods that you like: Do not eat foods that you do not find satisfying or enjoyable. Eating that way will make you feel like you are on a diet.    Look before you eat: Before you eat, look at your food, its portion size, and presentation. Breathe deeply. Look again before taking a mouthful.    Chew every mouthful: Chewing a lot helps to thoroughly release the flavor of foods. Let food sit on your tongue. This allows your taste buds to absorb the flavor and transmit messages about your appetite to your brain.    Talk or eat: When you are talking, stop eating. When you are eating, stop talking.    Stay connected: Pay attention to your body's appetite signals while you are eating.    Pause while you are eating: Think about how you are feeling about your food in terms of quality and quantity.    Know when to stop eating: Stop eating when flavor intensity declines, as it is bound to do. Do not try to polish off all of the food in front of you. Instead, aim for the moment when flavor peaks and you feel an internal “ah” of satisfaction--then stop.    Evaluate how full you are: Keep asking yourself while you are eating, “Am I still hungry?” and “Am I satisfied?”    by Ankita Ricketts [facility]  and       Return in  about 4 months (around 9/28/2025) for weight management via clinic or Telemedicine Visit and in clinic in Dec/Jan.    Patient verbalizes understanding.    Roxana Zuleyka, JULIO  5/27/2025    DOCUMENTATION OF TIME SPENT: Code selection for this visit was based on time spent : 60 minutes on date of service in preparing to see the patient, obtaining and/or reviewing separately obtained history, performing a medically appropriate examination, counseling and educating the patient/family/caregiver, ordering medications or testing, referring and communicating with other healthcare providers, documenting clinical information in the electronic medical record, independently interpreting results and communicating results to the patient/family/caregiver and care coordination with the patient's other providers.         [1]   Current Outpatient Medications on File Prior to Visit   Medication Sig Dispense Refill    albuterol (PROAIR HFA) 108 (90 Base) MCG/ACT Inhalation Aero Soln Inhale 2 puffs into the lungs every 6 (six) hours as needed for Wheezing or Shortness of Breath. 3 each 3    benzonatate 200 MG Oral Cap Take 1 capsule (200 mg total) by mouth 3 (three) times daily as needed. 21 capsule 0     No current facility-administered medications on file prior to visit.

## 2025-05-28 ENCOUNTER — OFFICE VISIT (OUTPATIENT)
Dept: INTERNAL MEDICINE CLINIC | Facility: CLINIC | Age: 25
End: 2025-05-28
Payer: COMMERCIAL

## 2025-05-28 VITALS
SYSTOLIC BLOOD PRESSURE: 128 MMHG | HEART RATE: 92 BPM | BODY MASS INDEX: 31.54 KG/M2 | DIASTOLIC BLOOD PRESSURE: 80 MMHG | WEIGHT: 178 LBS | OXYGEN SATURATION: 98 % | HEIGHT: 63 IN | RESPIRATION RATE: 18 BRPM

## 2025-05-28 DIAGNOSIS — J45.20 MILD INTERMITTENT ASTHMA WITHOUT COMPLICATION (HCC): ICD-10-CM

## 2025-05-28 DIAGNOSIS — Z51.81 ENCOUNTER FOR THERAPEUTIC DRUG MONITORING: Primary | ICD-10-CM

## 2025-05-28 DIAGNOSIS — E66.811 CLASS 1 OBESITY WITH SERIOUS COMORBIDITY AND BODY MASS INDEX (BMI) OF 31.0 TO 31.9 IN ADULT, UNSPECIFIED OBESITY TYPE: ICD-10-CM

## 2025-05-28 PROCEDURE — 99215 OFFICE O/P EST HI 40 MIN: CPT | Performed by: NURSE PRACTITIONER

## 2025-05-28 RX ORDER — TIRZEPATIDE 5 MG/.5ML
5 INJECTION, SOLUTION SUBCUTANEOUS WEEKLY
Qty: 2 ML | Refills: 3 | Status: SHIPPED | OUTPATIENT
Start: 2025-05-28

## 2025-05-28 RX ORDER — TIRZEPATIDE 2.5 MG/.5ML
2.5 INJECTION, SOLUTION SUBCUTANEOUS WEEKLY
Qty: 2 ML | Refills: 0 | Status: SHIPPED | OUTPATIENT
Start: 2025-05-28

## 2025-05-28 NOTE — PATIENT INSTRUCTIONS
Welcome to the [facility]...your Lifestyle Renovation begins now!  Thank you for placing your trust in our health care team, I look forward to working with you along this journey to better health!    Next steps:     1.  Call our office at [phone] to schedule a personal nutrition consultation with one of our registered dieticians, Flaquito Jameson. Bring along your food journal (3 days minimum). See journal options below.  2.  Complete fasting (10-12 hours, water only) labs at [facility] lab site prior to next office visit. Lab results will be communicated via ZipMatch.  3.  Body composition completed today with findings of: Total body fat: 37% (goal < 32%), Visceral Fat: 7 (goal <10), Muscle mass: 16.3% (goal >18%), and waist circumference 35 inches (goal <35 inches).  4.  Use contraception at all times while on anti-obesity medications.  5.  Fill your prescribed medication and take as discussed and prescribed: Start Zepbound at 2.5 mg weekly. After 4 weeks increase to the next dose of 5 mg weekly. If at a weight plateau for >3 weeks, then send ZipMatch message with current scale weight to determine if a dose adjustment is appropriate. Otherwise plan to maintain this dose until next visit. Any further dose titrations beyond this dose will be considered at appointments only, unless otherwise discussed. Visit the website www.zepbound.[place] and click on Consumers for additional details, savings, and further dosing instructions. This medication may require a prior authorization (PA) by your insurance. A PA may take one week plus to complete and our office will be in touch during this process if needed. If cost/supply prohibitive plan: generic alternative to Contrave (www.contrave.com) with Naltrexone and Bupropion XL.    Tips while taking an injectable medication:    Be an intuitive eater. Listen to your hunger and fullness signals, stopping when you are full.  Consume protein and  produce in your day, striving for a rainbow of color of produce.  Reduce portions to starting size of 1 cup and check in with your gut to see if you are full. Use a sand timer to slow down your eating pace to allow for 15-20 minutes to complete a meal and use the \"2 bite rule\".  Reduce refined sugars and high fat foods, as they may contribute to greater side effects of nausea and heartburn.  Stop eating 3 hours before bedtime to allow your food to digest.  Remain hydrated with water or non caloric and non caffeine beverages.  Use over the counter raegan lozenge/supplement to help reduce nausea if needed.  If you have been off your medication for more than 2 weeks please notify our office to determine next dosing, as a return to previous dose may not be appropriate or tolerated.  Zepbound can be kept at room temperature for up to 3 weeks.    Please try to work on the following dietary changes this first month:    1.  Drink water with meals and throughout the day, cut down on soda and/or juice if consumed. Consider flavored water options like Bubbly, Spindrift, Hint and Lakisha. Reduce alcohol servings to 4 per week maximum.  2.  Have protein with each meal, examples include: greek yogurt, cottage cheese, hard boiled egg, tofu, chicken, fish, or tuna.   3.  Work towards reducing/eliminating refined carbohydrates and sugars which includes items such as sweets, as well as rice, pasta, and bread and make sure to choose whole grain options when having them with just 1 serving per meal about the size of your inner palm.  4.  Consume non starchy veggies daily working towards making them a good 50% of your daily food intake. Add them to lunch and dinner consistently.  5.  Start a daily probiotic: VSL#3 is recommended, (order on line at www.vsl3.com). Take 1 capsule daily with water for 30 days, then reduce to 1 every other day (this will reduce the cost). Capsules can be left out of refrigerator for 2 weeks. I recommend  using a pill box weekly and keeping the bottle in the fridge.    Please download harper My Fitness Pal, LoseIt! Or My Net Diary to monitor daily dietary intake and you will be able to see if you are eating the right amount of calories or too much or too little which would hinder weight loss. Additionally this will help to see your daily carbohydrate and protein intake. When you set the harper up choose 1.5 lbs/week as a goal.  Keeping a paper food journal is an option as well to remain accountable for your choices- this is the start to mindful eating! A low calorie diet has been consistently shown to support weight loss.    Continue or start exercising to help establish a routine. If not already exercising begin with 1 day/week and progress as able with the goal of working towards 30 minutes 5 days a week at a minimum. A variety or cardio, strength and stretching is important. Review resources below to help support you in building this healthy routine.    Meditation daily can help manage and control stress. Chronic stress can make weight loss difficult.  Exercising is one way to help with stress, but meditation using the CALM Harper or another comparable alternative can be done in your home or place of work with little time commitment. This Harper can also help work on behavior change and improve sleep. Check out the segment under Calm Masterclass and listen to The 4 Pillars of Health. A great way to begin learning about the foundation of lifestyle with practical tips to use in your every day. In addition, we offer counseling services and support for individual connection and care. A referral is necessary so please let me know if this is a service you are interested.    Check out www.yourweightmatters.org blog for continued support and education along your weight loss journey to optimal health!      Patient Resources:    Personal Training/Fitness Classes/Health Coaching    [facility] [place]: Full  fitness center with group fitness and personal training located in [place].  Health Coaching with Ankita Burk, Tello Angeles, and Shaan Junior at our [facility]- individual coaching to work on your health goals. Call [phone] and/or email @ [email or web address]. Free 60 minute consult when client of [facility].  FABI Temple @ http://www.FrameBlast. A variety of group fitness options plus various yoga classes [phone] and/or email Darling at [email or web address]  [facility] Fitness Centers with multiple locations: [employer] (www.Paradigm), Green A5 Training (www.t03cnmezzezSan Marcos Springs), EZprints.com Body Bootcamp (www.Trig Medical), [employer] (www.Xeris Pharmaceuticals), The Exercise  (www.exercisecoach.com), Club PilSwitchfly (www.Carnet de Mode)    Online Fitness  Fitness  on Conatix  Fit in 10 DVD series   www.Gravy  Chair exercises via Sit and Be Fit (www.sitandbefit.org) and Simplify (www.Tinybop) or Maikel Simon or Errol Saunders videos on YouTube.  Hip Hop Fit with Harrison Mckinnon at www.hiphopfitBioDigital    Apps for on the Go Fitness  [place] 7 Minute Workout (orange box with white 7) - free on the go HIIT training harper  Peloton Harper @ www.onepeloton.com    Nutrition Trackers, Meal Preparation, and Other Meal Programs  LoseIT! And My Fitness Pal apps and on line for tracking nutrition  NOOM - virtual health coaching  FitFoundation (healthy meals on the go) in [place] @ www.izlyxohixrduw8e.Diagnosia  Salvador ANAYA @ www.bistromd.Diagnosia and Vltsuz52 (calorie smart and low carb plans recommended) @ www.btapig83.com, Metabolic Meals @ www.MyMetabolicMeals.com - individual prepared meals to go  Gobble, Blue Apron, Home , Every Plate, Sunbasket- on line meal delivery programs for preparation at home  Meal Dee in [place] for homemade meals to go @ www.mealPoken.com  Diet Doctor @ www.dietdoctor.com - low carb  swaps  ReciMe and Mealime phillip (grocery and meal planning)    Stress, Anxiety, Depression, Trauma  CALM meditation phillip (www.calm.com)  Headspace  Don't let anxiety run your life. Using the science of emotion regulation and mindfulness to overcome fear and worry by Johann Willis PsyD and Brayden Reza MA.  The Gemidis Podcast (September 27, 2023): 6 Magic Words That Stop Anxiety  What Happened to You?- a look at the impact trauma has on behavior written by Lauryn Cisneros and Dr. Osman Castro  Whole Again by Miguel Angel Horn - discovering your true self after trauma    Mindful Eating/The Hungry Brain  Am I Hungry? Mindful eating virtual  phillip (www.amihungry.com)  The Hungry Brain by Kita Reyes, PhD  Mindless Eating by Harmeet Lara  Weight Loss Surgery Will Not Treat Food Addiction by Franca Mayorga Ph.D    Metabolic Dysfunction, Hormones and Cravings  Why We Get Sick? By Rajendra Cochran (insulin resistance)  Your Body in Balance: The New Science of Food, Hormones, and Health by Dr. Shahbaz Rodriguez  The Complete Guide to fasting by Dr. Castillo  Fast Like a Girl by Dr. Ileana Brown  The M Factor (documentary on PBS about Menopause)  Sugar, Salt & Fat by Sydney Qureshi, Ph.D, R.D.  The Truth About Sugar - documentary on sugar (Free on Zulahoo, https://youPaired Healthu.be/8R9owbyNL6b)  Presentation on SUGAR called Sugar: The Bitter Truth by Dr. Pravin Malone (Zulahoo) https://youtu.be/dBnniua6-oM?si=[email or web address]  Reverse Visceral Fat: #1 Way to Increase Your Lifespan & End Inflammation with Dr. Gil Bustillo on Utube @ https://youPaired Healthu.be/nupPRnvUpJY?si=qp3cztDoTTP5WkyB    Nutrition Support  You Are What You Eat - Netfix series on twin study looking at impact of nutrition changes on health  The End of Dieting: How to Live for Life by Dr. Alexsander Mccormick M.D. or listen to The Air [employer] Podcast Episode 63: Understanding \"Nutritarian\" Eating w/Dr. Alexsander Mccormick  The Game Changers- Animail Documentary on plant based  nutrition  The Dr. Garcia  Wellness Plan by Dr. Deon Garcia MD  The Complete Guide to fasting by Dr. Castillo  @[facility] ([facility] Dietician with support surrounding nutrition and meal prep/planning)    Education, Motivation and Support Resources  Live to 100: Secrets of the Blue Zones - Netflix series on the secrets to communities living over 100 years old  Atomic Habits by Ho Swartz (a book about taking small steps to promote greater behavior change)   Motivation phillip (black box with white \")- daily supportive messages sent to your phone  Can't Hurt Me by Johann Nielsen (a book exploring the power of discipline in achieving your goals)  Fed Up - documentary about obesity (Free on Utube)  Www.yourweightmatters.org - Obesity Action Coalition sponsored Blog posts  Obesity Action Coalition Resources on topics specific to weight management (www.obesityaction.org)  Fitlosophy Fitspiration - journal to better health (journal book found at [employer] in fitness aisle)  Chilo Bustamante talk titled: The Call to Courage (Netflix)  The Exam Room by the Physician's Committee (Podcast)  Nutrition Facts by Dr. Pulliam (Podcast)    Balanced Nutrition includes:     Build the mentality of Food 4 Fuel. Clean eating with whole foods and eliminating/reducing ultra processed foods.  Be an intuitive eater and using mindful eating practices.  Eat a balanced plate with protein and produce at all meals: 1/4 plate- protein, 1/2 plate non starchy veggies, and 1/4 plate fruit or complex carbohydrate.  Drink water with all meals and use a salad plate to naturally reduce portions.  Eliminate/reduce late night eating by stopping after 7pm. Allowing your body to fast for 12 hours (drink only water, tea or black coffee without any additives).                Mindful Eating Tips:  When we sit down to a meal by ourselves or with friends, it's easy to zone out and disconnect from our bodies. But, if you approach eating a meal with the intent to stay mindful  and present, you will be able to enjoy yourself and walk away from the table feeling good about yourself and your choices. Here are several tips to keep in mind when it comes to food and eating.    Choose for yourself: Do not get hung up on what other people are eating. Instead, ask yourself what you would like to eat.    Forget about good and bad: Remind yourself that foods fall on a nutritional continuum (high value/low value), not on a moral continuum (good/bad).    Stay clear of guilt or shame: Refrain from allowing guilt or shame to contaminate your eating decisions. Avoid secret eating and get clear on who you are really hiding from if you eat in secret.    Choose foods that you like: Do not eat foods that you do not find satisfying or enjoyable. Eating that way will make you feel like you are on a diet.    Look before you eat: Before you eat, look at your food, its portion size, and presentation. Breathe deeply. Look again before taking a mouthful.    Chew every mouthful: Chewing a lot helps to thoroughly release the flavor of foods. Let food sit on your tongue. This allows your taste buds to absorb the flavor and transmit messages about your appetite to your brain.    Talk or eat: When you are talking, stop eating. When you are eating, stop talking.    Stay connected: Pay attention to your body's appetite signals while you are eating.    Pause while you are eating: Think about how you are feeling about your food in terms of quality and quantity.    Know when to stop eating: Stop eating when flavor intensity declines, as it is bound to do. Do not try to polish off all of the food in front of you. Instead, aim for the moment when flavor peaks and you feel an internal “ah” of satisfaction--then stop.    Evaluate how full you are: Keep asking yourself while you are eating, “Am I still hungry?” and “Am I satisfied?”    by Ankita Ricketts [facility]  and

## 2025-06-03 ENCOUNTER — PATIENT MESSAGE (OUTPATIENT)
Dept: INTERNAL MEDICINE CLINIC | Facility: CLINIC | Age: 25
End: 2025-06-03

## 2025-06-03 DIAGNOSIS — Z51.81 ENCOUNTER FOR THERAPEUTIC DRUG MONITORING: Primary | ICD-10-CM

## 2025-06-03 DIAGNOSIS — E66.811 CLASS 1 OBESITY WITH SERIOUS COMORBIDITY AND BODY MASS INDEX (BMI) OF 31.0 TO 31.9 IN ADULT, UNSPECIFIED OBESITY TYPE: ICD-10-CM

## 2025-06-04 ENCOUNTER — TELEPHONE (OUTPATIENT)
Dept: INTERNAL MEDICINE CLINIC | Facility: CLINIC | Age: 25
End: 2025-06-04

## 2025-06-04 DIAGNOSIS — E66.811 CLASS 1 OBESITY WITH SERIOUS COMORBIDITY AND BODY MASS INDEX (BMI) OF 31.0 TO 31.9 IN ADULT, UNSPECIFIED OBESITY TYPE: Primary | ICD-10-CM

## 2025-06-04 DIAGNOSIS — Z51.81 ENCOUNTER FOR THERAPEUTIC DRUG MONITORING: ICD-10-CM

## 2025-06-04 NOTE — TELEPHONE ENCOUNTER
PA needed for zepbound 2.5 mg  Fax sent from [facility]  Call   ID  [phone]    Pa entered in epic today  Only asks one question - are we requesting RX through pharmacy benefits   Will likely be denied

## 2025-06-05 NOTE — TELEPHONE ENCOUNTER
[facility] of Zepbound stating it is a plan exclusion

## 2025-06-07 RX ORDER — NALTREXONE HYDROCHLORIDE 50 MG/1
25 TABLET, FILM COATED ORAL DAILY
Qty: 15 TABLET | Refills: 2 | Status: SHIPPED | OUTPATIENT
Start: 2025-06-07

## 2025-06-07 RX ORDER — BUPROPION HYDROCHLORIDE 150 MG/1
150 TABLET ORAL EVERY MORNING
Qty: 30 TABLET | Refills: 2 | Status: SHIPPED | OUTPATIENT
Start: 2025-06-07

## 2025-06-09 RX ORDER — TIRZEPATIDE 2.5 MG/.5ML
2.5 INJECTION, SOLUTION SUBCUTANEOUS WEEKLY
Qty: 2 ML | Refills: 2 | Status: SHIPPED | OUTPATIENT
Start: 2025-06-09

## (undated) NOTE — LETTER
ASTHMA ACTION PLAN for Larence To     : 2000     Date: 2018  Provider:  Paulina Santacruz PA-C  Phone for doctor or clinic: [facility], [place] [address], [address] [place] [phone]

## (undated) NOTE — MR AVS SNAPSHOT
[address]  [address] [facility] [address] [phone]               Thank you for choosing us for your health care visit with Ani Reese PA-C.   We are glad to serve you and happy to provide you with · Avoid being exposed to cigarette smoke (yours or others’). · You may use acetaminophen or ibuprofen to control pain and fever, unless another medicine was prescribed.  (Note: If you have chronic liver or kidney disease, have ever had a stomach ulcer or g Today's Vital Signs     BP Pulse    118/78 mmHg (76 %, Z = 0.72 / 88 %, Z = 1.15*) 72    Temp Height    97.9 °F (36.6 °C) (Oral) 63\" (32 %*, Z = -0.48)    Weight BMI    146 lb (81 %*, Z = 0.88) 25.87 kg/m2 (86 %*, Z = 1.07)    *Growth percentiles are base Fully enjoy your food when eating. Don’t eat while distracted and slow down. Avoid over sized portions. Don’t eat while when you’re bored.      EAT THESE FOODS MORE OFTEN: EAT THESE FOODS LESS OFTEN:   Make half your plate fruits and vegetables Highly

## (undated) NOTE — LETTER
ASTHMA ACTION PLAN for Jose Qiu     : 2000     Date: 3/30/2021  Provider:  JULIO Cheek  Phone for doctor or clinic: [facility], [facility]  [facility], [address] [place] [address]  [place] Kt [phone] M

## (undated) NOTE — LETTER
Carline Camarillo, :2000    CONSENT FOR PROCEDURE/SEDATION    1. I authorize the performance upon Haliaureliano Peña  the following: Romeo Cord IUD Insertion    2.  I authorize NINI Arce (and whomever is designated as the doctor’s assistant), to p Witness: _________________________________________ Date:___________     Physician Signature: _______________________________ Date:___________

## (undated) NOTE — LETTER
ASTHMA ACTION PLAN for Taina Arroyo     : 2000     Date: 2019  Provider:  JULIO Acuña  Phone for doctor or clinic: [facility], [facility] 2 [address], [address] [place] [address] [phone]    [facility]

## (undated) NOTE — Clinical Note
ASTHMA ACTION PLAN for Esthela Los     : 2000     Date: 2017  Provider:  King Macias PA-C  Phone for doctor or clinic: [facility], [place] [address], [address] [place] [phone]

## (undated) NOTE — Clinical Note
Thank you for referring Carline to the [facility]. Consult was completed today via clinic.  I have ordered labs, referred for a nutrition consultation with our dietician.  I counseled on the importance of lifestyle intervention in adjunct with medication and started Zepbound for treatment with follow-up advised in about 4 months.

## (undated) NOTE — LETTER
ASTHMA ACTION PLAN for Birgit Cummins     : 2000     Date: 3/1/2023  Provider:  JULIO Aguillon  Phone for doctor or clinic: [facility], [place] [address], [address]  [place] [address] [id number] [phone]    ACT Score: 25      You can use the colors of a traffic light to help learn about your asthma medicines. 1. Green - Go! % of Personal Best Peak Flow Use controller medicine. Breathing is good  No cough or wheeze  Can work and play Medicine How much to take When to take it          2. Yellow - Caution. 50-79% Personal Best Peak  Flow. Use reliever medicine to keep an asthma attack from getting bad. Cough  Wheezing  Tight Chest  Wake up at night Medicine How much to take When to take it    Albuterol Inhaler         2 puffs every four hours as needed for wheezing       Additional instructions         3. Red - Stop! Danger!  <50% Personal Best Peak  Flow. Take these medications until  Get help from a doctor   Medicine not helping  Breathing is hard and fast  Nose opens wide  Can't walk  Ribs show  Can't talk well Medicine How much to take When to take it    Albuterol Inhaler         2 puffs now     Additional Instructions If your symptoms do not improve and you cannot contact your doctor, [facility] or call 911 immediately! [x] Asthma Action Plan reviewed with patient (and caregiver if necessary) and a copy of the plan was given to the patient/caregiver. [] Asthma Action Plan reviewed with patient (and caregiver if necessary) on the phone and mailed copy to patient or submitted via [address]      Signatures:  Provider  JULIO Aguillon   Patient Caretaker

## (undated) NOTE — Clinical Note
Thank you for referring Carline to the [facility]. Consult was completed today via clinic.  I have ordered labs, referred for a nutrition consultation with our dietician.  I counseled on the importance of lifestyle intervention in adjunct with medication and started Zepbound for treatment with follow-up advised in about 4 months.